# Patient Record
Sex: MALE | Race: WHITE | NOT HISPANIC OR LATINO | URBAN - METROPOLITAN AREA
[De-identification: names, ages, dates, MRNs, and addresses within clinical notes are randomized per-mention and may not be internally consistent; named-entity substitution may affect disease eponyms.]

---

## 2024-02-29 ENCOUNTER — OFFICE VISIT (OUTPATIENT)
Dept: ORTHOPEDIC SURGERY | Facility: CLINIC | Age: 64
End: 2024-02-29
Payer: COMMERCIAL

## 2024-02-29 DIAGNOSIS — M76.71 PERONEAL TENDINITIS OF RIGHT LOWER EXTREMITY: ICD-10-CM

## 2024-02-29 DIAGNOSIS — M76.61 ACHILLES TENDINITIS OF RIGHT LOWER EXTREMITY: ICD-10-CM

## 2024-02-29 DIAGNOSIS — M19.071 PRIMARY OSTEOARTHRITIS OF RIGHT ANKLE: ICD-10-CM

## 2024-02-29 DIAGNOSIS — M19.071 PRIMARY OSTEOARTHRITIS OF RIGHT FOOT: Primary | ICD-10-CM

## 2024-02-29 PROCEDURE — 2500000004 HC RX 250 GENERAL PHARMACY W/ HCPCS (ALT 636 FOR OP/ED): Performed by: ORTHOPAEDIC SURGERY

## 2024-02-29 PROCEDURE — 99214 OFFICE O/P EST MOD 30 MIN: CPT | Performed by: ORTHOPAEDIC SURGERY

## 2024-02-29 PROCEDURE — 2500000005 HC RX 250 GENERAL PHARMACY W/O HCPCS: Performed by: ORTHOPAEDIC SURGERY

## 2024-02-29 RX ORDER — TRIAMCINOLONE ACETONIDE 40 MG/ML
40 INJECTION, SUSPENSION INTRA-ARTICULAR; INTRAMUSCULAR
Status: COMPLETED | OUTPATIENT
Start: 2024-02-29 | End: 2024-02-29

## 2024-02-29 RX ORDER — LIDOCAINE HYDROCHLORIDE 10 MG/ML
2 INJECTION INFILTRATION; PERINEURAL
Status: COMPLETED | OUTPATIENT
Start: 2024-02-29 | End: 2024-02-29

## 2024-02-29 RX ADMIN — TRIAMCINOLONE ACETONIDE 40 MG: 40 INJECTION, SUSPENSION INTRA-ARTICULAR; INTRAMUSCULAR at 13:38

## 2024-02-29 RX ADMIN — LIDOCAINE HYDROCHLORIDE 2 ML: 10 INJECTION, SOLUTION INFILTRATION; PERINEURAL at 13:37

## 2024-02-29 RX ADMIN — TRIAMCINOLONE ACETONIDE 40 MG: 40 INJECTION, SUSPENSION INTRA-ARTICULAR; INTRAMUSCULAR at 13:37

## 2024-02-29 RX ADMIN — LIDOCAINE HYDROCHLORIDE 2 ML: 10 INJECTION, SOLUTION INFILTRATION; PERINEURAL at 13:38

## 2024-02-29 NOTE — PROGRESS NOTES
Patient ID: Olivier Chadwick is a 63 y.o. male.    M Inj/Asp on 2/29/2024 1:37 PM  Indications: pain  Details: anterolateral approach  Medications: 40 mg triamcinolone acetonide 40 mg/mL; 2 mL lidocaine 10 mg/mL (1 %)

## 2024-02-29 NOTE — LETTER
February 29, 2024     Cornell Sandhu MD  [address] [facility] [address]    Patient: Olivier Chadwick   YOB: 1960   Date of Visit: 2/29/2024       Dear Dr. Cornell Sandhu MD:    Thank you for referring Olivier Chadwick to me for evaluation. Below are my notes for this consultation.  If you have questions, please do not hesitate to call me. I look forward to following your patient along with you.       Sincerely,     Lasha Whitt MD      CC: No Recipients  ______________________________________________________________________________________    Patient ID: Olivier Chadwick is a 63 y.o. male.    M Inj/Asp (Right foot second and third metatarsal tarsal articulation) on 2/29/2024 1:38 PM  Details: dorsal approach  Medications: 40 mg triamcinolone acetonide 40 mg/mL; 2 mL lidocaine 10 mg/mL (1 %)        Patient ID: Olivier Chadwick is a 63 y.o. male.    M Inj/Asp on 2/29/2024 1:37 PM  Indications: pain  Details: anterolateral approach  Medications: 40 mg triamcinolone acetonide 40 mg/mL; 2 mL lidocaine 10 mg/mL (1 %)        This 63-year-old gentleman with hereditary peripheral neuropathy and spastic diplegia cerebral palsy returns today complaining of right ankle and foot pain.  Patient notes intra-articular steroid injections that I provided to him last June and his ankle and second and third metatarsal tarsal articulation markedly alleviated his symptoms until recently.  He is now having pain with weightbearing activities particularly on unlevel ground.  Patient is also having pain over the Achilles tendon for the last 6 months.    Review of systems:  A complete review of the patient's past medical history and review of 30 systems and all medications and allergies was performed. Please see adult medical record for details.    A Family history for genetic or familial inheritance issues and Social history including smoking history, alcohol consumption and exercise activities was also reviewed  and significant findings noted in the patients history of present illness.    Physical Exam:  The patient is well-nourished and well-developed and in no acute distress. The patient displayed normal mood and affect.  Patient's respirations appear to be regular and unlabored.  Examination of the right foot reveals mild tenderness over the Achilles tendon.  There is no swelling.  The tendon is intact.  There is tenderness to palpation of the right ankle joint.  Ankle joint has minimal motion.  There is pain over the second and third metatarsal tarsal articulation.  The calf is soft and nontender and without swelling.  The neurovascular exam is intact.    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    After adequate informed consent I injected the right ankle and right second and third metatarsal tarsal articulation each with 40 mg of Kenalog and local anesthesia.  I prescribed physical therapy for his Achilles tendinitis.    I would be delighted to see the patient back the future should  they  have further problems.    Note dictated with [employer] software.  Completed without full type editing and sent to avoid delay.

## 2024-02-29 NOTE — PROGRESS NOTES
Patient ID: Olivier Chadwick is a 63 y.o. male.    M Inj/Asp (Right foot second and third metatarsal tarsal articulation) on 2/29/2024 1:38 PM  Details: dorsal approach  Medications: 40 mg triamcinolone acetonide 40 mg/mL; 2 mL lidocaine 10 mg/mL (1 %)

## 2024-02-29 NOTE — PROGRESS NOTES
This 63-year-old gentleman with hereditary peripheral neuropathy and spastic diplegia cerebral palsy returns today complaining of right ankle and foot pain.  Patient notes intra-articular steroid injections that I provided to him last June and his ankle and second and third metatarsal tarsal articulation markedly alleviated his symptoms until recently.  He is now having pain with weightbearing activities particularly on unlevel ground.  Patient is also having pain over the Achilles tendon for the last 6 months.    Review of systems:  A complete review of the patient's past medical history and review of 30 systems and all medications and allergies was performed. Please see adult medical record for details.    A Family history for genetic or familial inheritance issues and Social history including smoking history, alcohol consumption and exercise activities was also reviewed and significant findings noted in the patients history of present illness.    Physical Exam:  The patient is well-nourished and well-developed and in no acute distress. The patient displayed normal mood and affect.  Patient's respirations appear to be regular and unlabored.  Examination of the right foot reveals mild tenderness over the Achilles tendon.  There is no swelling.  The tendon is intact.  There is tenderness to palpation of the right ankle joint.  Ankle joint has minimal motion.  There is pain over the second and third metatarsal tarsal articulation.  The calf is soft and nontender and without swelling.  The neurovascular exam is intact.    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    After adequate informed consent I injected the right ankle and right second and third metatarsal tarsal articulation each with 40 mg of Kenalog and local anesthesia.  I prescribed physical therapy for his Achilles tendinitis.    I would be delighted to see the  patient back the future should  they  have further problems.    Note dictated with FreeDrive software.  Completed without full type editing and sent to avoid delay.

## 2024-03-07 ENCOUNTER — OFFICE VISIT (OUTPATIENT)
Dept: ORTHOPEDIC SURGERY | Facility: HOSPITAL | Age: 64
End: 2024-03-07
Payer: COMMERCIAL

## 2024-03-07 ENCOUNTER — HOSPITAL ENCOUNTER (OUTPATIENT)
Dept: RADIOLOGY | Facility: HOSPITAL | Age: 64
Discharge: HOME | End: 2024-03-07
Payer: COMMERCIAL

## 2024-03-07 VITALS — WEIGHT: 155 LBS | HEIGHT: 68 IN | BODY MASS INDEX: 23.49 KG/M2

## 2024-03-07 DIAGNOSIS — M25.551 RIGHT HIP PAIN: ICD-10-CM

## 2024-03-07 DIAGNOSIS — S76.011A SPRAIN, GLUTEUS MEDIUS, RIGHT, INITIAL ENCOUNTER: Primary | ICD-10-CM

## 2024-03-07 PROCEDURE — 99213 OFFICE O/P EST LOW 20 MIN: CPT | Performed by: ORTHOPAEDIC SURGERY

## 2024-03-07 PROCEDURE — 73502 X-RAY EXAM HIP UNI 2-3 VIEWS: CPT | Mod: RIGHT SIDE | Performed by: RADIOLOGY

## 2024-03-07 PROCEDURE — 99203 OFFICE O/P NEW LOW 30 MIN: CPT | Performed by: ORTHOPAEDIC SURGERY

## 2024-03-07 PROCEDURE — 1036F TOBACCO NON-USER: CPT | Performed by: ORTHOPAEDIC SURGERY

## 2024-03-07 PROCEDURE — 73502 X-RAY EXAM HIP UNI 2-3 VIEWS: CPT | Mod: RT

## 2024-03-07 ASSESSMENT — PAIN - FUNCTIONAL ASSESSMENT: PAIN_FUNCTIONAL_ASSESSMENT: 0-10

## 2024-03-07 ASSESSMENT — PAIN SCALES - GENERAL: PAINLEVEL_OUTOF10: 6

## 2024-03-07 ASSESSMENT — PAIN DESCRIPTION - DESCRIPTORS: DESCRIPTORS: SHARP

## 2024-03-08 NOTE — PROGRESS NOTES
63-year-old is seen with pain along the lateral side of the pelvis on the right.  He has been having a persistent moderate throbbing pain over the last 6 to 12 months.  He is a  at the [employer].  He has had foot and ankle issues and has had treatment for this.  He has spastic diplegia cerebral palsy.  He has used Advil 400 mg daily and Tylenol.    Pleasant in no acute distress.  Walks an antalgic gait.  He is tender along the anterior superior iliac spine and along the iliac crest on the right side.  Both hips flex to 90 degrees with adequate internal and external rotation.  No particular trochanteric bursa tenderness.  No tenderness on the anterior aspect of the hip.  Lower extremities are well-perfused.    AP pelvis and AP/lateral x-rays of the right hip are personally reviewed and the joint spaces are maintained and there is no acute bony abnormality.    Discussion about the pain was done.  He was reassured about the radiographic appearance of his hip.  He mostly appears to have a gluteus medius/hip flexor strain.  He would benefit from physical therapy.  He can use ibuprofen and Tylenol.  Follow-up if not improving.

## 2024-08-16 ENCOUNTER — HOSPITAL ENCOUNTER (OUTPATIENT)
Dept: RADIOLOGY | Facility: CLINIC | Age: 64
Discharge: HOME | End: 2024-08-16
Payer: COMMERCIAL

## 2024-08-16 ENCOUNTER — OFFICE VISIT (OUTPATIENT)
Dept: ORTHOPEDIC SURGERY | Facility: CLINIC | Age: 64
End: 2024-08-16
Payer: COMMERCIAL

## 2024-08-16 DIAGNOSIS — M19.071 PRIMARY OSTEOARTHRITIS OF RIGHT ANKLE: ICD-10-CM

## 2024-08-16 DIAGNOSIS — M76.71 PERONEAL TENDINITIS OF RIGHT LOWER EXTREMITY: ICD-10-CM

## 2024-08-16 DIAGNOSIS — M19.071 PRIMARY OSTEOARTHRITIS OF RIGHT FOOT: Primary | ICD-10-CM

## 2024-08-16 PROCEDURE — 1036F TOBACCO NON-USER: CPT | Performed by: ORTHOPAEDIC SURGERY

## 2024-08-16 PROCEDURE — 2500000005 HC RX 250 GENERAL PHARMACY W/O HCPCS: Performed by: ORTHOPAEDIC SURGERY

## 2024-08-16 PROCEDURE — 99214 OFFICE O/P EST MOD 30 MIN: CPT | Performed by: ORTHOPAEDIC SURGERY

## 2024-08-16 PROCEDURE — 2500000004 HC RX 250 GENERAL PHARMACY W/ HCPCS (ALT 636 FOR OP/ED): Performed by: ORTHOPAEDIC SURGERY

## 2024-08-16 PROCEDURE — 73630 X-RAY EXAM OF FOOT: CPT | Mod: RT

## 2024-08-16 PROCEDURE — 20605 DRAIN/INJ JOINT/BURSA W/O US: CPT | Mod: RT | Performed by: ORTHOPAEDIC SURGERY

## 2024-08-16 RX ORDER — LIDOCAINE HYDROCHLORIDE 10 MG/ML
2 INJECTION INFILTRATION; PERINEURAL
Status: COMPLETED | OUTPATIENT
Start: 2024-08-16 | End: 2024-08-16

## 2024-08-16 RX ORDER — TRIAMCINOLONE ACETONIDE 40 MG/ML
40 INJECTION, SUSPENSION INTRA-ARTICULAR; INTRAMUSCULAR
Status: COMPLETED | OUTPATIENT
Start: 2024-08-16 | End: 2024-08-16

## 2024-08-16 NOTE — PROGRESS NOTES
This 63-year-old gentleman was seen with his sister for right foot pain.  Patient notes the intra-articular steroid injections that I gave him in February markedly alleviated his symptoms until recently.  The patient has hereditary peripheral neuropathy with spastic diplegia cerebral palsy.    The patient notes couple of months ago getting out of his vehicle he injured the medial arch area of his foot and had some bruising and swelling that has resolved.  He notes continued soreness in the medial arch area.  Patient also complains of pain in his ankle and in the metatarsal tarsal articulation.    Review of systems:  A complete review of the patient's past medical history and review of 30 systems and all medications and allergies was performed. Please see adult medical record for details.    A Family history for genetic or familial inheritance issues and Social history including smoking history, alcohol consumption and exercise activities was also reviewed and significant findings noted in the patients history of present illness.    Physical Exam:  The patient is well-nourished and well-developed and in no acute distress. The patient displayed normal mood and affect. The patient's pupils were equal, round sclerae are white. Patient's respirations appear to be regular and unlabored.    Examination of the patient's right foot and ankle revealed the following.  Delete there did not appear to be any skin abnormalities or lymphangitis. There was no swelling noted and no erythema.  There was  tenderness to palpation over the anterior ankle and over the second and third metatarsal tarsal articulation.  There was tenderness along the mid arch region over the plantar fascia.  There was minimal motion in the ankle and foot unchanged from previous exam.  The neurovascular examination was unchanged from previous exam.    Imaging:  I personally reviewed and measured the radiographs including AP and lateral views of the extremity  and they revealed equinus deformity of the foot with severe arthritis in the midtarsal region making the x-rays difficult to read.  Arthritic changes in the ankle no acute fracture or dislocation.    Impression & Plan:   It is my impression I suspect this patient sustained a contusion to his plantar fascia.  He has arthritis of the midfoot and ankle.  After adequate informed consent I injected the ankle and second and third metatarsal tarsal articulation each with 40 mg of Kenalog and local anesthesia.    We discussed the possible need in the future for fusion surgery to the arthritic joints.  We discussed the fact that I would not recommend surgery unless the patient was having significant pain not relieved with conservative care that was interfering with his activities of daily living.    I have explained to the patient that I will be retiring at the end of the year and I referred the patient to Dr. Dioni Reyes one of our experts in foot and ankle surgery.      Note dictated with [employer] software.  Completed without full type editing and sent to avoid delay.

## 2024-08-16 NOTE — PROGRESS NOTES
Patient ID: Olivier Chadwick is a 63 y.o. male.    M Inj/Asp: R ankle on 8/16/2024 10:17 AM  Indications: pain  Details: anterior approach  Medications: 40 mg triamcinolone acetonide 40 mg/mL; 2 mL lidocaine 10 mg/mL (1 %)

## 2024-08-16 NOTE — LETTER
August 16, 2024     Cornell Sandhu MD  [address] [facility] [address]    Patient: Olivier Chadwick   YOB: 1960   Date of Visit: 8/16/2024       Dear Dr. Cornell Sandhu MD:    Thank you for referring Olivier Chadwick to me for evaluation. Below are my notes for this consultation.  If you have questions, please do not hesitate to call me. I look forward to following your patient along with you.       Sincerely,     Lasha Whitt MD      CC: No Recipients  ______________________________________________________________________________________    Patient ID: Olivier Chadwick is a 63 y.o. male.    M Inj/Asp on 8/16/2024 10:18 AM  Indications: pain  Medications: 40 mg triamcinolone acetonide 40 mg/mL; 2 mL lidocaine 10 mg/mL (1 %)          Patient ID: Olivier Chadwick is a 63 y.o. male.    M Inj/Asp: R ankle on 8/16/2024 10:17 AM  Indications: pain  Details: anterior approach  Medications: 40 mg triamcinolone acetonide 40 mg/mL; 2 mL lidocaine 10 mg/mL (1 %)          This 63-year-old gentleman was seen with his sister for right foot pain.  Patient notes the intra-articular steroid injections that I gave him in February markedly alleviated his symptoms until recently.  The patient has hereditary peripheral neuropathy with spastic diplegia cerebral palsy.    The patient notes couple of months ago getting out of his vehicle he injured the medial arch area of his foot and had some bruising and swelling that has resolved.  He notes continued soreness in the medial arch area.  Patient also complains of pain in his ankle and in the metatarsal tarsal articulation.    Review of systems:  A complete review of the patient's past medical history and review of 30 systems and all medications and allergies was performed. Please see adult medical record for details.    A Family history for genetic or familial inheritance issues and Social history including smoking history, alcohol consumption and exercise  activities was also reviewed and significant findings noted in the patients history of present illness.    Physical Exam:  The patient is well-nourished and well-developed and in no acute distress. The patient displayed normal mood and affect. The patient's pupils were equal, round sclerae are white. Patient's respirations appear to be regular and unlabored.    Examination of the patient's right foot and ankle revealed the following.  Delete there did not appear to be any skin abnormalities or lymphangitis. There was no swelling noted and no erythema.  There was  tenderness to palpation over the anterior ankle and over the second and third metatarsal tarsal articulation.  There was tenderness along the mid arch region over the plantar fascia.  There was minimal motion in the ankle and foot unchanged from previous exam.  The neurovascular examination was unchanged from previous exam.    Imaging:  I personally reviewed and measured the radiographs including AP and lateral views of the extremity and they revealed equinus deformity of the foot with severe arthritis in the midtarsal region making the x-rays difficult to read.  Arthritic changes in the ankle no acute fracture or dislocation.    Impression & Plan:   It is my impression I suspect this patient sustained a contusion to his plantar fascia.  He has arthritis of the midfoot and ankle.  After adequate informed consent I injected the ankle and second and third metatarsal tarsal articulation each with 40 mg of Kenalog and local anesthesia.    We discussed the possible need in the future for fusion surgery to the arthritic joints.  We discussed the fact that I would not recommend surgery unless the patient was having significant pain not relieved with conservative care that was interfering with his activities of daily living.    I have explained to the patient that I will be retiring at the end of the year and I referred the patient to Dr. Dioni Reyes one of our  experts in foot and ankle surgery.      Note dictated with [employer] software.  Completed without full type editing and sent to avoid delay.

## 2024-08-16 NOTE — PROGRESS NOTES
Patient ID: Olivier Chadwick is a 63 y.o. male.    M Inj/Asp on 8/16/2024 10:18 AM  Indications: pain  Medications: 40 mg triamcinolone acetonide 40 mg/mL; 2 mL lidocaine 10 mg/mL (1 %)

## 2025-03-28 ENCOUNTER — OFFICE VISIT (OUTPATIENT)
Dept: ORTHOPEDIC SURGERY | Facility: CLINIC | Age: 65
End: 2025-03-28
Payer: COMMERCIAL

## 2025-03-28 ENCOUNTER — HOSPITAL ENCOUNTER (OUTPATIENT)
Dept: RADIOLOGY | Facility: CLINIC | Age: 65
Discharge: HOME | End: 2025-03-28
Payer: COMMERCIAL

## 2025-03-28 VITALS — BODY MASS INDEX: 23.49 KG/M2 | WEIGHT: 155 LBS | HEIGHT: 68 IN

## 2025-03-28 DIAGNOSIS — M19.071 PRIMARY OSTEOARTHRITIS OF RIGHT FOOT: ICD-10-CM

## 2025-03-28 PROCEDURE — 73630 X-RAY EXAM OF FOOT: CPT | Mod: RT

## 2025-03-28 PROCEDURE — 99214 OFFICE O/P EST MOD 30 MIN: CPT | Mod: 25 | Performed by: STUDENT IN AN ORGANIZED HEALTH CARE EDUCATION/TRAINING PROGRAM

## 2025-03-28 PROCEDURE — 99214 OFFICE O/P EST MOD 30 MIN: CPT | Performed by: STUDENT IN AN ORGANIZED HEALTH CARE EDUCATION/TRAINING PROGRAM

## 2025-03-28 PROCEDURE — 2500000004 HC RX 250 GENERAL PHARMACY W/ HCPCS (ALT 636 FOR OP/ED): Performed by: STUDENT IN AN ORGANIZED HEALTH CARE EDUCATION/TRAINING PROGRAM

## 2025-03-28 PROCEDURE — 20605 DRAIN/INJ JOINT/BURSA W/O US: CPT | Performed by: STUDENT IN AN ORGANIZED HEALTH CARE EDUCATION/TRAINING PROGRAM

## 2025-03-28 PROCEDURE — 20605 DRAIN/INJ JOINT/BURSA W/O US: CPT | Mod: RT | Performed by: STUDENT IN AN ORGANIZED HEALTH CARE EDUCATION/TRAINING PROGRAM

## 2025-03-28 PROCEDURE — 3008F BODY MASS INDEX DOCD: CPT | Performed by: STUDENT IN AN ORGANIZED HEALTH CARE EDUCATION/TRAINING PROGRAM

## 2025-03-28 PROCEDURE — 1036F TOBACCO NON-USER: CPT | Performed by: STUDENT IN AN ORGANIZED HEALTH CARE EDUCATION/TRAINING PROGRAM

## 2025-03-28 RX ORDER — TRIAMCINOLONE ACETONIDE 40 MG/ML
40 INJECTION, SUSPENSION INTRA-ARTICULAR; INTRAMUSCULAR
Status: COMPLETED | OUTPATIENT
Start: 2025-03-28 | End: 2025-03-28

## 2025-03-28 RX ORDER — LIDOCAINE HYDROCHLORIDE 10 MG/ML
2 INJECTION, SOLUTION INFILTRATION; PERINEURAL
Status: COMPLETED | OUTPATIENT
Start: 2025-03-28 | End: 2025-03-28

## 2025-03-28 RX ADMIN — LIDOCAINE HYDROCHLORIDE 2 ML: 10 INJECTION, SOLUTION INFILTRATION; PERINEURAL at 16:48

## 2025-03-28 RX ADMIN — TRIAMCINOLONE ACETONIDE 40 MG: 200 INJECTION, SUSPENSION INTRA-ARTICULAR; INTRAMUSCULAR at 16:48

## 2025-03-28 ASSESSMENT — PAIN - FUNCTIONAL ASSESSMENT: PAIN_FUNCTIONAL_ASSESSMENT: 0-10

## 2025-03-28 ASSESSMENT — PAIN DESCRIPTION - DESCRIPTORS: DESCRIPTORS: ACHING;SORE;DISCOMFORT

## 2025-03-28 ASSESSMENT — PAIN SCALES - GENERAL: PAINLEVEL_OUTOF10: 7

## 2025-03-28 NOTE — PROGRESS NOTES
ORTHOPAEDIC SURGERY OUTPATIENT PROGRESS NOTE    Chief Complaint:  Right ankle pain    History Of Present Illness  Olivier Chadwick is a 64 y.o. male with a past medical history of peripheral neuropathy and spastic diplegia cerebral palsy who presents for evaluation of right ankle pain with his sister.  Patient previously underwent what sounds like triple arthrodesis and the 1980s without fixation.  He then underwent a partial ankle fusion by report with some remaining articulation.  Patient continues to receive significant benefit from his corticosteroid injections.  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     Past Medical History  Past Medical History:   Diagnosis Date    Personal history of urinary calculi     History of renal calculi       Surgical History  Recent Surgeries in Orthopaedic Surgery            No cases to display             Social History  Social History     Socioeconomic History    Marital status: Single   Tobacco Use    Smoking status: Never    Smokeless tobacco: Never   Vaping Use    Vaping status: Never Used   Substance and Sexual Activity    Alcohol use: Never    Drug use: Never       Family History  No family history on file.     Allergies  Allergies   Allergen Reactions    Morphine GI Upset       Review of Systems  REVIEW OF SYSTEMS  Constitutional: no unplanned weight loss  Psychiatric: no suicidal ideation  ENT: no vision changes, no sinus problems  Pulmonary: no shortness of breath  Lymphatic: no enlarged lymph nodes  Cardiovascular: no chest pain or shortness of breath  Gastrointestinal: no stomach problems  Genitourinary: no dysuria   Skin: no rashes  Endocrine: no thyroid problems  Neurological: no headache, no numbness  Hematological: no easy bruising  Musculoskeletal: Right ankle pain     Physical Exam  PHYSICAL EXAMINATION  Constitutional Exam: well developed and well nourished  Psychiatric Exam: alert and oriented,  "appropriate mood and behavior  Eye Exam: EOMI  Pulmonary Exam: breathing non-labored, no apparent distress  Lymphatic exam: no appreciable lymphadenopathy in the lower extremities  Cardiovascular exam: RRR to peripheral palpation, DP pulses 2+, PT 2+, toes are pink with good capillary refill, no pitting edema  Skin exam: no open lesions, rashes, abrasions or ulcerations  Neurological exam: sensation to light touch intact in both lower extremities in peripheral and dermatomal distributions (except for any abnormalities noted in musculoskeletal exam)    Musculoskeletal exam: Right lower extremity examination.  Patient has well-healed foot and ankle incisions.  He continues to localize pain to the tibiotalar joint.  Patient has hindfoot varus alignment with cavus foot posture and cock-up toe deformity of the great toe with constricted EHL tendon.  Patient has painful and restricted ankle range of motion with obviously restricted subtalar and midtarsal joint range of motion.  Patient neurosensory examination otherwise within normal limits.     Last Recorded Vitals  Height 1.727 m (5' 8\"), weight 70.3 kg (155 lb).    Laboratory Results  No results found for this or any previous visit (from the past 24 hours).     Radiology Results  X-ray imaging 3 view weightbearing right foot reviewed and independently evaluated by me demonstrates prior triple arthrodesis with flattening of the talar dome and decreased joint space suggestive bilateral projection as well as midfoot OA.    Patient ID: Olivier Chadwick is a 64 y.o. male.    M Inj/Asp on 3/28/2025 4:48 PM  Medications: 2 mL lidocaine 10 mg/mL (1 %); 40 mg triamcinolone acetonide 40 mg/mL    I reviewed the risks and benefits of right tibiotalar injection with the patient.  Risks including pain, bleeding, infection, hypopigmentation of the skin, loss of subcutaneous fat, metabolic complications and possibility that further injections may not be effective.  The patient gave " informed consent for the procedure.       A time-out was called and confirmed identifying the right tibiotalar as the site of injection.  Sterile skin preparation was made over the right tibiotalar joint just medial to the palpable TA tendon and the soft spot.  Then, using sterile technique, the right tibiotalar was injected with 2 cc of 1% lidocaine and 1cc of kenalog 40 mg with no complications.   The patient was given post-procedure instructions and precautions.  I personally performed the procedure.              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  I have reviewed the diagnosis and treatment options extensively with the patient.  I would recommend the patient continue weightbearing to his tolerance in his right lower extremity.  We discussed the diagnostic and therapeutic benefits of a repeat tibiotalar corticosteroid injection, please see my separate procedure note.  I discussed with the patient that if he were to fail an exhaustive and appropriate course of nonoperative treatment of pending CT review consideration could be made for realignment arthrodesis.  I would plan to see the patient back in 3 months for repeat clinical and radiographic evaluation.  Discussed with the patient if he were to not notice significant benefit from tibiotalar injection, consideration could be made for midtarsal injection in 6 weeks.  Upon return, patient require three-view weightbearing right ankle.    Dioni Hughes MD, MAGGIE  Department of Orthopaedic Surgery  [facility]    The diagnosis and treatment plan were reviewed with the patient. All questions were answered. The patient verbalized understanding of the treatment plan. There were no barriers to understanding identified.    Note dictated with [employer]  transcription software.  Completed without full type editing and sent to avoid delay.

## 2025-06-27 ENCOUNTER — HOSPITAL ENCOUNTER (OUTPATIENT)
Dept: RADIOLOGY | Facility: CLINIC | Age: 65
Discharge: HOME | End: 2025-06-27
Payer: COMMERCIAL

## 2025-06-27 ENCOUNTER — OFFICE VISIT (OUTPATIENT)
Dept: ORTHOPEDIC SURGERY | Facility: CLINIC | Age: 65
End: 2025-06-27
Payer: COMMERCIAL

## 2025-06-27 DIAGNOSIS — M19.071 PRIMARY OSTEOARTHRITIS OF RIGHT ANKLE: ICD-10-CM

## 2025-06-27 DIAGNOSIS — M19.071 PRIMARY OSTEOARTHRITIS OF RIGHT ANKLE: Primary | ICD-10-CM

## 2025-06-27 DIAGNOSIS — M19.071 PRIMARY OSTEOARTHRITIS OF RIGHT FOOT: ICD-10-CM

## 2025-06-27 PROCEDURE — 2500000004 HC RX 250 GENERAL PHARMACY W/ HCPCS (ALT 636 FOR OP/ED): Performed by: STUDENT IN AN ORGANIZED HEALTH CARE EDUCATION/TRAINING PROGRAM

## 2025-06-27 PROCEDURE — 20600 DRAIN/INJ JOINT/BURSA W/O US: CPT | Performed by: STUDENT IN AN ORGANIZED HEALTH CARE EDUCATION/TRAINING PROGRAM

## 2025-06-27 PROCEDURE — 73610 X-RAY EXAM OF ANKLE: CPT | Mod: RT

## 2025-06-27 PROCEDURE — 99214 OFFICE O/P EST MOD 30 MIN: CPT | Performed by: STUDENT IN AN ORGANIZED HEALTH CARE EDUCATION/TRAINING PROGRAM

## 2025-06-27 PROCEDURE — 20600 DRAIN/INJ JOINT/BURSA W/O US: CPT | Mod: RT | Performed by: STUDENT IN AN ORGANIZED HEALTH CARE EDUCATION/TRAINING PROGRAM

## 2025-06-27 PROCEDURE — 99212 OFFICE O/P EST SF 10 MIN: CPT | Mod: 25 | Performed by: STUDENT IN AN ORGANIZED HEALTH CARE EDUCATION/TRAINING PROGRAM

## 2025-06-27 RX ORDER — LIDOCAINE HYDROCHLORIDE 10 MG/ML
2 INJECTION, SOLUTION INFILTRATION; PERINEURAL
Status: COMPLETED | OUTPATIENT
Start: 2025-06-27 | End: 2025-06-27

## 2025-06-27 RX ORDER — TRIAMCINOLONE ACETONIDE 40 MG/ML
40 INJECTION, SUSPENSION INTRA-ARTICULAR; INTRAMUSCULAR
Status: COMPLETED | OUTPATIENT
Start: 2025-06-27 | End: 2025-06-27

## 2025-06-27 RX ADMIN — LIDOCAINE HYDROCHLORIDE 2 ML: 10 INJECTION, SOLUTION INFILTRATION; PERINEURAL at 20:31

## 2025-06-27 RX ADMIN — TRIAMCINOLONE ACETONIDE 40 MG: 200 INJECTION, SUSPENSION INTRA-ARTICULAR; INTRAMUSCULAR at 20:31

## 2025-06-27 ASSESSMENT — PAIN SCALES - GENERAL: PAINLEVEL_OUTOF10: 5 - MODERATE PAIN

## 2025-06-27 ASSESSMENT — PAIN - FUNCTIONAL ASSESSMENT: PAIN_FUNCTIONAL_ASSESSMENT: 0-10

## 2025-06-27 ASSESSMENT — PAIN DESCRIPTION - DESCRIPTORS: DESCRIPTORS: ACHING;SHARP;SHOOTING

## 2025-06-28 NOTE — PROGRESS NOTES
ORTHOPAEDIC SURGERY OUTPATIENT PROGRESS NOTE    Chief Complaint:  Right ankle pain    History Of Present Illness  Olivier Chadwick is a 64 y.o. male with a past medical history of peripheral neuropathy and spastic diplegia cerebral palsy who presents for evaluation of right ankle pain with his sister.  Patient previously underwent what sounds like triple arthrodesis and the 1980s without fixation.  He then underwent a partial ankle fusion by report with some remaining articulation.  Patient continues to receive significant benefit from his corticosteroid injections.  He does note that the ankle tends to give out on him, and particular while working in [employer].    06/27/2025: Patient returns for follow-up of his right foot and ankle pain as above.  He is reporting 5 out of 10 pain.  Pain is worse when getting up from a seated position.  He reports only a few weeks of pain relief following his most recent injection.  He reports better relief from his injections in the past.  He would like to continue working, his sister is concerned that his continued weightbearing is contributing to his symptoms.     Past Medical History  Past Medical History:   Diagnosis Date    Personal history of urinary calculi     History of renal calculi       Surgical History  Recent Surgeries in Orthopaedic Surgery            No cases to display             Social History  Social History     Socioeconomic History    Marital status: Single   Tobacco Use    Smoking status: Never    Smokeless tobacco: Never   Vaping Use    Vaping status: Never Used   Substance and Sexual Activity    Alcohol use: Never    Drug use: Never       Family History  No family history on file.     Allergies  Allergies   Allergen Reactions    Morphine GI Upset       Review of Systems  REVIEW OF SYSTEMS  Constitutional: no unplanned weight loss  Psychiatric: no suicidal ideation  ENT: no vision changes, no sinus problems  Pulmonary: no shortness of  breath  Lymphatic: no enlarged lymph nodes  Cardiovascular: no chest pain or shortness of breath  Gastrointestinal: no stomach problems  Genitourinary: no dysuria   Skin: no rashes  Endocrine: no thyroid problems  Neurological: no headache, no numbness  Hematological: no easy bruising  Musculoskeletal: Right ankle pain     Physical Exam  PHYSICAL EXAMINATION  Constitutional Exam: well developed and well nourished  Psychiatric Exam: alert and oriented, appropriate mood and behavior  Eye Exam: EOMI  Pulmonary Exam: breathing non-labored, no apparent distress  Lymphatic exam: no appreciable lymphadenopathy in the lower extremities  Cardiovascular exam: RRR to peripheral palpation, DP pulses 2+, PT 2+, toes are pink with good capillary refill, no pitting edema  Skin exam: no open lesions, rashes, abrasions or ulcerations  Neurological exam: sensation to light touch intact in both lower extremities in peripheral and dermatomal distributions (except for any abnormalities noted in musculoskeletal exam)    Musculoskeletal exam: Right lower extremity examination.  Patient has well-healed foot and ankle incisions.  He continues to localize pain to the tibiotalar joint and more distally about the midfoot, he is focally tender to palpation of the midfoot.  No significant pain with midfoot stress..  Patient has hindfoot varus alignment with cavus foot posture and cock-up toe deformity of the great toe with constricted EHL tendon.  Patient has painful and restricted ankle range of motion with obviously restricted subtalar and midtarsal joint range of motion.  Patient neurosensory examination otherwise within normal limits.     Last Recorded Vitals  There were no vitals taken for this visit.    Laboratory Results  No results found for this or any previous visit (from the past 24 hours).     Radiology Results  X-ray imaging 3 view weightbearing right ankle reviewed and independently evaluated by me on 6/27/2025 demonstrates mild to  moderate tibiotalar osteoarthritis with obvious joint space loss particular the medial gutter.  No obvious fracture or dislocation, increased mineralization of the posterior soft tissue about the Achilles.    Patient ID: Olivier Chadwick is a 64 y.o. male.    S Inj/Asp on 6/27/2025 8:31 PM  Medications: 2 mL lidocaine 10 mg/mL (1 %); 40 mg triamcinolone acetonide 40 mg/mL    I reviewed the risks and benefits of right midfoot corticosteroid injection with the patient.  Risks including pain, bleeding, infection, hypopigmentation of the skin, loss of subcutaneous fat, metabolic complications and possibility that further injections may not be effective.  The patient gave informed consent for the procedure.       A time-out was called and confirmed identifying the right midfoot as the site of injection.  Sterile skin preparation was made over the right midfoot well lateral to the palpable DP pulse and medial to the projected course of the SPN.  Then, using sterile technique, the right midfoot was injected with 2 cc of 1% lidocaine and 1cc of kenalog 40 mg with no complications.   The patient was given post-procedure instructions and precautions.  I personally performed the procedure.              Assessment/Plan:  64-year-old male who my impression has right foot and ankle pain secondary to tibiotalar osteoarthritis and midfoot osteoarthritis in the setting of prior triple arthrodesis with residual hindfoot varus malalignment/cavus.  I have reviewed the diagnosis and treatment options extensively with the patient.  I would recommend the patient continue weightbearing to his tolerance in his right lower extremity.  We discussed the diagnostic and therapeutic benefits of a right midfoot corticosteroid injection, please see my separate procedure note.  I discussed frankly with the patient and his sister that agent consideration could be made for both tibiotalar and midfoot corticosteroid injections as previously provided  with Dr. Whitt.  I anticipate the increased steroid dose may be more synergistic for his severity of arthritis.  Upon return, patient would not wire further imaging.    repeat tibiotalar corticosteroid injection, please see my separate procedure note.  I discussed with the patient that if he were to fail an exhaustive and appropriate course of nonoperative treatment of pending CT review consideration could be made for realignment arthrodesis.  I would plan to see the patient back in 3 months for repeat clinical and radiographic evaluation.  Discussed with the patient if he were to not notice significant benefit from tibiotalar injection, consideration could be made for midtarsal injection in 6 weeks.  Upon return, patient require three-view weightbearing right ankle.    Dioni Hughes MD, MAGGIE  Department of Orthopaedic Surgery  [facility]    The diagnosis and treatment plan were reviewed with the patient. All questions were answered. The patient verbalized understanding of the treatment plan. There were no barriers to understanding identified.    Note dictated with [employer] software.  Completed without full type editing and sent to avoid delay.

## 2025-09-26 ENCOUNTER — APPOINTMENT (OUTPATIENT)
Dept: ORTHOPEDIC SURGERY | Facility: CLINIC | Age: 65
End: 2025-09-26
Payer: COMMERCIAL